# Patient Record
(demographics unavailable — no encounter records)

---

## 2024-11-15 NOTE — PHYSICAL EXAM
[Midline] : trachea located in midline position [Normal] : no rashes [de-identified] : AD CI and effusion

## 2024-11-15 NOTE — REASON FOR VISIT
[Subsequent Evaluation] : a subsequent evaluation for [Gastroesophageal Reflux] : gastroesophageal reflux [FreeTextEntry2] : otosclerosis, reflux and globus sensation

## 2024-11-15 NOTE — HISTORY OF PRESENT ILLNESS
[de-identified] : 63 year old female with history of sarcoidosis, otosclerosis, reflux and dysphagia presents for f/u Has had cough since august - tried prednisone with relief but cough returned once she stopped. Currently on levalbuterol nebs, trelegy ellipta, azelastine, albuterol PRN Having PND and total loss of voice for about 1 week - now it is still hoarse Reports continued globus sensation and frequent throat clearing, having thick clear secretions Exposed to norovirus 2 weeks ago No longer on omeprazole, now on pantoprazole. Did not feel famotidine helped  Feels swallowing is stable Reports wheezing at night Occasional use of right hearing aid - not wearing it today. Intermittent right otalgia and tinnitus Denies otorrhea and ear infections.

## 2025-01-13 NOTE — PHYSICAL EXAM
[de-identified] : Patient is WDWN, alert, and in no acute distress. Breathing is unlabored. She is grossly oriented to person, place and time.  Right thumb: Tenderness, edema, and ecchymosis present. Limited ROM in thumb. Mild numbness in right thumb.    [de-identified] : AP, lateral, and oblique views of the right thumb were obtained today and revealed possible foreign body, otherwise no abnormalities.

## 2025-01-13 NOTE — ADDENDUM
[FreeTextEntry1] : This note was written by Hank Dejesus on 01/13/2025 actively solely ISMAEL Jarrett M.D.     All medical record entries made by the Scribe were at my, ISMAEL Jarrett M.D. direction and personally dictated by me on 01/13/2025. I have personally reviewed the chart and agree that the record reflects my personal performance of the history, physical exam, assessment, and plan.

## 2025-01-13 NOTE — DISCUSSION/SUMMARY
[de-identified] : The underlying pathophysiology was reviewed with the patient. XR films were reviewed with the patient. Discussed at length the nature of the patient's condition. Her right thumb symptoms appear secondary to laceration.  Continue using bacitracin for wound and antibiotics. Advised to work with gloves.   Patient should follow up next week for suture removal.

## 2025-01-13 NOTE — HISTORY OF PRESENT ILLNESS
[de-identified] : Patient is a 64 y/o female with c/o right thumb pain. She broke a champagne bottle on Dec 31st, 2024, and a large piece of glass lacerated her right thumb. She went to [facility] immediately and was put in sutures due to laceration. She was given Cephalexin at first and now is currently taking Doxycycline. She works as a teacher.

## 2025-01-13 NOTE — PHYSICAL EXAM
[de-identified] : Patient is WDWN, alert, and in no acute distress. Breathing is unlabored. She is grossly oriented to person, place and time.  Right thumb: Tenderness, edema, and ecchymosis present. Limited ROM in thumb. Mild numbness in right thumb.    [de-identified] : AP, lateral, and oblique views of the right thumb were obtained today and revealed possible foreign body, otherwise no abnormalities.

## 2025-01-13 NOTE — HISTORY OF PRESENT ILLNESS
[de-identified] : Patient is a 62 y/o female with c/o right thumb pain. She broke a champagne bottle on Dec 31st, 2024, and a large piece of glass lacerated her right thumb. She went to [facility] immediately and was put in sutures due to laceration. She was given Cephalexin at first and now is currently taking Doxycycline. She works as a teacher.

## 2025-01-13 NOTE — DISCUSSION/SUMMARY
[de-identified] : The underlying pathophysiology was reviewed with the patient. XR films were reviewed with the patient. Discussed at length the nature of the patient's condition. Her right thumb symptoms appear secondary to laceration.  Continue using bacitracin for wound and antibiotics. Advised to work with gloves.   Patient should follow up next week for suture removal.

## 2025-01-21 NOTE — HISTORY OF PRESENT ILLNESS
[de-identified] : Patient is a 64 y/o female with c/o right thumb pain. She broke a champagne bottle on Dec 31st, 2024, and a large piece of glass lacerated her right thumb. She went to [facility] immediately and was put in sutures due to laceration. She was given Cephalexin at first and now is currently taking Doxycycline. She works as a teacher.  Today, 01/21/2025, the patient presents for a follow-up evaluation and suture removal. Her symptoms have improved since last visit. She reports compliance with her Bactrim medication.

## 2025-01-21 NOTE — PHYSICAL EXAM
[de-identified] : Patient is WDWN, alert, and in no acute distress. Breathing is unlabored. She is grossly oriented to person, place and time.  Right thumb: Tenderness, edema, and ecchymosis present. Limited ROM in thumb. Mild numbness in right thumb.   No signs of infection.  [de-identified] : AP, lateral, and oblique views of the right thumb were obtained on 01/13/2025 and revealed possible glass foreign body adjacent to radial base of the distal phalanx,

## 2025-01-21 NOTE — DISCUSSION/SUMMARY
[de-identified] : The underlying pathophysiology was reviewed with the patient. XR films were reviewed with the patient. Discussed at length the nature of the patient's condition. Her right thumb symptoms appear secondary to laceration.   The sutures were removed. The patient was instructed on local wound care and to keep the incision site dry for 7 more days. The patient can wear a band-aid They may then begin to massage the scar with vitamin E oil. Gentle range of motion, stretching, and strengthening exercises were encouraged. Patient should actively work on gradually increasing use of the hand, as tolerated.    Patient was advised to take OTC medications and topical analgesic for pain management.  The patient can discontinue her antibiotic medication.  Patient should follow up in 2 weeks.

## 2025-01-31 NOTE — PHYSICAL EXAM
[Midline] : trachea located in midline position [Normal] : no rashes [de-identified] : AD CI and effusion

## 2025-01-31 NOTE — HISTORY OF PRESENT ILLNESS
[de-identified] : 63 year old female with history of sarcoidosis, otosclerosis, reflux and dysphagia presents for f/u and SLN injection Has had cough since august. Was decreasing from last visit in November.  Recently completed 2 abx course cephalexin and doxycycline after recent right hand trauma and laceration.  Cough recently returned mid January.  Continues on omeprazole in AM. Occasionally will take pantoprazole PRN at night.  Continues to have voice hoarseness with PND. Constant throat clearing. Had received voice therapy sessions but no further sessions planned. Doxycycline finished 2 weeks Reports intermittent wheezing at night which resolves.  Levalbuterol nebs, trelegy ellipta, azelastine, albuterol PRN  Having PND and total loss of voice for about 1 week - now it is still hoarse Swallowing stable denies dysphagia. Noted occasional gagging  Occasional use of right hearing aid - not wearing it today. Intermittent right otalgia and tinnitus Denies otorrhea and ear infections.

## 2025-02-04 NOTE — PHYSICAL EXAM
[No Acute Distress] : no acute distress [Normal Oropharynx] : normal oropharynx [III] : Mallampati Class: III [Normal Appearance] : normal appearance [No Neck Mass] : no neck mass [Normal Rate/Rhythm] : normal rate/rhythm [Normal S1, S2] : normal s1, s2 [No Resp Distress] : no resp distress [Clear to Auscultation Bilaterally] : clear to auscultation bilaterally [No Abnormalities] : no abnormalities [Benign] : benign [Normal Gait] : normal gait [No Clubbing] : no clubbing [No Cyanosis] : no cyanosis [No Edema] : no edema [FROM] : FROM [Normal Color/ Pigmentation] : normal color/ pigmentation [No Focal Deficits] : no focal deficits [Oriented x3] : oriented x3 [Normal Affect] : normal affect [TextBox_2] : ow  [TextBox_54] : 2/6 systolic murmur [TextBox_68] : I:E 1:3, clear

## 2025-02-04 NOTE — HISTORY OF PRESENT ILLNESS
[FreeTextEntry1] : Ms. Marquez is a 63 year old female with a history of abnormal chest CT, allergic rhinitis, GERD, snoring, SOB, who comes in today for a follow-up pulmonary evaluation. Her chief complaint is  -she notes developing a cough  -she notes slicing her finger open and being put on doxycycline  -she notes her GERD became active  -she notes appetite is stable -she notes diet is good -she notes SOB with exertion  -she notes vision is stable -she notes bowels are regular -she notes concern over XRAY -she notes snoring   -she denies any headaches, nausea, emesis, fever, chills, sweats, chest pain, chest pressure, wheezing, palpitations, diarrhea, constipation, dysphagia, vertigo, arthralgias, myalgias, leg swelling, itchy eyes, itchy ears, or sour taste in the mouth.

## 2025-02-04 NOTE — REASON FOR VISIT
[Follow-Up] : a follow-up visit [FreeTextEntry1] : COVID-19 1/2022, 11/2022, influenza 12/2022, abnormal chest CT, asthma, allergic rhinitis, GERD, sarcoidosis, snoring, SOB, and throat clearing

## 2025-02-04 NOTE — ADDENDUM
[FreeTextEntry1] : Documented by Jett Mcrae acting as a scribe for Dr. Johnathan Parker on 02/04/2025. All medical record entries made by the Scribe were at my, Dr. Johnathan Parker's, direction and personally dictated by me on 02/04/2025. I have reviewed the chart and agree that the record accurately reflects my personal performance of the history, physical exam, assessment and plan. I have also personally directed, reviewed, and agree with the discharge instructions.

## 2025-02-04 NOTE — PROCEDURE
[FreeTextEntry1] : Full PFT reveals normal flows; FEV1 was  2.10L which is 107% of predicted; normal lung volumes; normal diffusion at 3.75, which is 93% of predicted; normal flow volume loop. PFTs were performed to evaluate for SOB  FENO was not covered; a normal value being less than 25 Fractional exhaled nitric oxide (FENO) is regarded as a simple, noninvasive method for assessing eosinophilic airway inflammation. Produced by a variety of cells within the lung, nitric oxide (NO) concentrations are generally low in healthy individuals. However, high concentrations of NO appear to be involved in nonspecific host defense mechanisms and chronic inflammatory diseases such as asthma. The American Thoracic Society (ATS) therefore has recommended using FENO to aid in the diagnosis and monitoring of eosinophilic airway inflammation and asthma, and for identifying steroid responsive individuals whose chronic respiratory symptoms may be caused by airway inflammation.   The American Thoracic Society (ATS) strongly recommends the use of FeNO measurement to aid in the assessment, management, and long-term monitoring of asthma. In their 2011 clinical practice guideline, the ATS emphasizes the importance of using FeNO.

## 2025-02-04 NOTE — ASSESSMENT
[FreeTextEntry1] : Ms. Marquez is a 61 year old female history of allergies, ?OSAS, severe persistent eosinophilic asthma, urticaria, overweight, s/p surgery for endometriosis and diverticulitis, sarcoidosis, and GERD who now comes in for a follow-up pulmonary evaluation s/p Covid-19 4/2020 - s/p rxn to COVID-19 vaccine Pfizer - COVID-19 1/2022- residual cough/LPR/PND, active asthma- improved with Nucala s/p COVID-19 1/2022, influenza 12/2022- resolved s/p multiple infections ?Nucala-related (resolved)- stable except LPR s/p steroid injection    Her shortness of breath is multifactorial due to: -overweight/out of shape -poor breathing mechanics -?CAD - respiratory issues (asthma)/ sarcoidosis  problem 1: (+) severe persistent asthma (stable on biologic rx) - non compliant -continue Accolate 20 mg BID - Trelegy 200 1 inhalation QD if needed) - Continue Proventil 2 Q6H or nebulizer -add Albuterol (.83) via nebulizer, up to Q6H Asthma is believed to be caused by inherited (genetic) and environmental factor, but its exact cause is unknown. Asthma may be triggered by allergens, lung infections, or irritants in the air. Asthma triggers are different for each person  Problem 1A: chest pain (quiet)- resolved - s/p ESR - HOLD colchicine.6 BID  problem 2: eosinophilic asthma (quiet) -candidate for Nucala / Fasenra / Dupixent - Nucala/ Fasenra if needed - approved but VIVO hesitant.- Nucala 1st shot 5/25/2022- HOLD NUCALA (6/2023) -if needed, start Dupixent -The safety and efficacy of Nucala was established in three double-blind, randomized, placebo-controlled trials in patients with severe asthma. Compared to a placebo, patients with severe asthma receiving Nucala had fewer exacerbation requiring hospitalization and/or emergency department visits, and a longer time to first exacerbation. In addition, patients with severe asthma receiving Nucala or Fasenra experienced greater reductions in their daily maintenance oral corticosteroid dose, while maintaining asthma control compared with patients receiving placebo. Treatment with Nucala did not result in a significant improvement in lung function, as measured by the volume of air exhaled by patients in one second. The most common side effects include: headache, injection site reactions, back pain, weakness, and fatigue; hypersensitivity reactions can occur within hours or days including swelling of the face, mouth, and tongue, fainting, dizziness, hives, breathing problems, and rash; herpes zoster infections have occurred. The drug is a monoclonal antibody that inhibits interleukin-5 which helps regular eosinophils, a type of white blood cell that contributes to asthma. The over-production of eosinophils can cause inflammation in the lungs, increasing the frequency of asthma attacks. Patients must also take other medications, including high dose inhaled corticosteroids and at least one additional asthma drug.  Problem 2A: ?Immunodeficiency -R/o immune deficiency -Complete blood work to include: quantitative immunoglobulins, IgG subsets, strep pneumonia titers, T cell subsets -Due to the fact that this pt has had more infections than would be expected and immunological blood work is indicated this would include: IgG subclasses, quantitative immunoglobulins, Strep pneumoniae titers as well as Vitamin D levels. Based on this blood work we will be able to decide where the pt needs additional pneumococcal vaccine, either Prevnar 13 or pneumovax. Immunology evaluation will also be potentially indicated.  problem 3: Sarcoidosis -recommended to follow up with Ophthalmologist -recommended to receive a cardiac evaluation -Sarcoidosis is a medical mystery and can present with very serious illness or little consequence. The disease can affect any organ including skin, liver, lymph glands, spleen, eyes, nervous system, musculoskeletal system, heart, brain, kidneys, and including the lungs. Pulmonary sarcoidosis can cause loss of lung volume and abnormal lung stiffness. This disease is characterized by presence of granulomas, small areas of inflamed cells. Sarcoidosis patients should have regular cardiac and eye examinations as well as regular PFTs.  Problem 4: PND syndrome (Allergy) -(active) -continue Astelin 0.15% 1 sniff/nostril BID - off Olopatadine 0.6%, 1 sniff each nostril BID - Recommended Xlear BID - continue Nasacort 1 sniff BID -continue Claritin 10 mg QD - continue Xyzal 5 mg QHS or zyrtex 10 qH - Environmental measures for allergies were encouraged including mattress and pillow cover, air purifier, and environmental controls.  Problem 5: LPRD -recommended Reflux Gourmet   -continue Pepcid 40 mg QHS -Omeprazole 40 mg before breakfast and dinner (PRN) -continue f/p w/ ENT (Rosalio) - s/p steroid injection  -Rule of 2s: avoid eating too much, eating too late, eating too spicy, eating two hours before bed. -Things to avoid including overeating, spicy foods, tight clothing, eating within three hours of bed, this list is not all inclusive. -For treatment of reflux, possible options discussed including diet control, H2 blockers, PPIs, as well as coating motility agents discussed as treatment options. Timing of meals and proximity of last meal to sleep were discussed. If symptoms persist, a formal gastrointestinal evaluation is needed.  problem 6: Urticaria/allergies - controlled -blood work to include: IgE level (-), eosinophil level (+), vitamin D level (+), food IgE level (-), and asthma profile (-)  problem 7: r/o YAYO (borderline) (4/2020) -s/p home sleep study to r/o YAYO - if symptoms persist - complete in Lab sleep study (8/2023) Sleep apnea is associated with adverse clinical consequences which an affect most organ systems. Cardiovascular disease risk includes arrhythmias, atrial fibrillation, hypertension, coronary artery disease, and stroke. Metabolic disorders include diabetes type 2, non-alcoholic fatty liver disease. Mood disorder especially depression; and cognitive decline especially in the elderly. Associations with chronic reflux/Wilde's esophagus some but not all inclusive. -Reasons include arousal consistent with hypopnea; respiratory events most prominent in REM sleep or supine position; therefore sleep staging and body position are important for accurate diagnosis and estimation of AHI.  problem 8: abnormal chest CT (Lymph nodes & Left upper lung density) c/w sarcoidosis adenopathy Ddx: sarcoidosis, lymphoma, primary lung cancer, tuberculosis, HERNANDEZ -s/p blood work: ACE level (Done), ESR (Done), hypersensitivity panel and QuantiFERON Gold (-) - s/p Lung Biopsy c/w sarcoidosis  -CT 9/2025 --s/p sarcoidosis, she is to have a cardiac evaluation, cardiac MRI and ophthalmologic evaluation - stable CAT scans are the only radiological modality to identify abnormalities w/in the lungs with regards to nodules/masses/lymph nodes. Risks, benefits were reviewed in detail. The guidelines for abnormalities include follow up CT scans at various intervals which could range from 6 weeks to 1 year intervals. If there is a change for the worse then consideration for a biopsy will be considered if you are a candidate. Second opinion evaluation with a thoracic surgeon or an interventional radiologist could be offered.  problem 9: overweight Weight loss, exercise, and diet control were discussed and are highly encouraged. Treatment options were given such as, aqua therapy, and contacting a nutritionist. Recommended to use the elliptical, stationary bike, less use of treadmill. Mindful eating was explained to the patient Obesity is associated with worsening asthma, shortness of breath, and potential for cardiac disease, diabetes, and other underlying medical conditions.  problem 10: poor breathing mechanics Proper breathing techniques were reviewed with an emphasis of exhalation. Patient instructed to breath in for 1 second and out for four seconds. Patient was encouraged to not talk while walking.  Problem 11: s/p COVID-19 infection 1/2022, 11/2022: -s/p covid 19 vaccine Pfizer x1 -rxn -Check antibodies -Hold 2nd vaccine dose until booster -covid 19 vaccine discussed with patient Immune Support Recommendations: -Recommend SPM -OTC Vitamin C 500mg BID -OTC Quercetin 250-500mg BID -OTC Zinc 75-100mg per day -OTC Melatonin 1or 2mg a night -OTC Vitamin D 1-4000mg per day  Problem 12: health maintenance -recommended SaNOtize nasal spray -s/p influenza 12/2022 - flu shot (refused) -recommended strep pneumonia vaccines: Prevnar-13 vaccine, followed by Pneumo vaccine 23 one year following -recommended early intervention for URIs -recommended regular osteoporosis evaluations -recommended early dermatological evaluations -recommended after the age of 50 to the age of 70, colonoscopy every 5 years  F/P in 3-4 months with spi and DLCO Patient is encouraged to call with any changes, concerns, or questions.

## 2025-07-08 NOTE — HISTORY OF PRESENT ILLNESS
[de-identified] : 63 year old female with history of sarcoidosis, otosclerosis, reflux and dysphagia presents for f/u  s/p SLN injection 1/31/25 & 04/08/25 Here today for 3rd kenalog injection   Reports great results after last injection, lasting for 2.5-3 months.  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  Intermittent voice hoarseness remains the same.  No longer experiencing "throat wheeze" or choking sensation.  No complaints of ear issues today.  Continues azelastine and ceterizine daily for allergies No recent courses of abx Swallowing stable denies dysphagia Denies otorrhea and ear infections.

## 2025-07-08 NOTE — CONSULT LETTER
[Dear  ___] : Dear  [unfilled], [Please see my note below.] : Please see my note below. [Consult Closing:] : Thank you very much for allowing me to participate in the care of this patient.  If you have any questions, please do not hesitate to contact me. [Sincerely,] : Sincerely, [Courtesy Letter:] : I had the pleasure of seeing your patient, [unfilled], in my office today. [FreeTextEntry3] : Celestino Santamaria MD, PhD Chief, Division of Laryngology Department of Otolaryngology [facility] Otolaryngology, [facility]

## 2025-07-08 NOTE — ADDENDUM
[FreeTextEntry1] : Documented by Du Valerio acting as scribe for Dr. Santamaria on 07/08/2025. All Medical record entries made by the Scribe were at my, Dr. Santamaria, direction and personally dictated by me on 07/08/2025 . I have reviewed the chart and agree that the record accurately reflects my personal performance of the history, physical exam, assessment and plan. I have also personally directed, reviewed, and agreed with the discharge instructions.

## 2025-07-08 NOTE — DATA REVIEWED
[de-identified] : Audiogram 7/8/2025 Type A AU. Left Ear WNL with a mild HFSNHL at 8000Hz. Right Ear severe profound mixed HL, conductive-dominant

## 2025-07-24 NOTE — PHYSICAL EXAM
[No Acute Distress] : no acute distress [Normal Oropharynx] : normal oropharynx [III] : Mallampati Class: III [Normal Appearance] : normal appearance [No Neck Mass] : no neck mass [Normal Rate/Rhythm] : normal rate/rhythm [Normal S1, S2] : normal s1, s2 [No Resp Distress] : no resp distress [Clear to Auscultation Bilaterally] : clear to auscultation bilaterally [No Abnormalities] : no abnormalities [Benign] : benign [Normal Gait] : normal gait [No Clubbing] : no clubbing [No Cyanosis] : no cyanosis [No Edema] : no edema [FROM] : FROM [Normal Color/ Pigmentation] : normal color/ pigmentation [No Focal Deficits] : no focal deficits [Oriented x3] : oriented x3 [Normal Affect] : normal affect [TextBox_2] : ow  [TextBox_68] : I:E 1:3, clear

## 2025-07-24 NOTE — ASSESSMENT
[FreeTextEntry1] : Ms. Marquez is a 63 year old female history of allergies, ?OSAS, severe persistent eosinophilic asthma, urticaria, overweight, s/p surgery for endometriosis and diverticulitis, sarcoidosis, and GERD who now comes in for a follow-up pulmonary evaluation s/p Covid-19 4/2020 - s/p rxn to COVID-19 vaccine Pfizer - COVID-19 1/2022- residual cough/LPR/PND, active asthma- improved with Nucala s/p COVID-19 1/2022, influenza 12/2022- resolved s/p multiple infections ?Nucala-related (resolved)- stable except LPR s/p steroid injection    Her shortness of breath is multifactorial due to: -overweight/out of shape -poor breathing mechanics -?CAD - respiratory issues (asthma)/ sarcoidosis  problem 1: (+) severe persistent asthma (Active) -continue Accolate 20 mg BID - Trelegy 200 1 inhalation QD if needed) - Continue Proventil 2 Q6H or nebulizer -continue Albuterol (.83) via nebulizer, up to Q6H Asthma is believed to be caused by inherited (genetic) and environmental factor, but its exact cause is unknown. Asthma may be triggered by allergens, lung infections, or irritants in the air. Asthma triggers are different for each person  Problem 1A: chest pain (quiet)- resolved - s/p ESR - HOLD colchicine.6 BID  problem 2: eosinophilic asthma (quiet) -candidate for Nucala / Fasenra / Dupixent - Nucala/ Fasenra if needed - approved but [facility] hesitant.- Nucala 1st shot 5/25/2022- HOLD NUCALA (6/2023) -if needed, start Dupixent -The safety and efficacy of Nucala was established in three double-blind, randomized, placebo-controlled trials in patients with severe asthma. Compared to a placebo, patients with severe asthma receiving Nucala had fewer exacerbation requiring hospitalization and/or emergency department visits, and a longer time to first exacerbation. In addition, patients with severe asthma receiving Nucala or Fasenra experienced greater reductions in their daily maintenance oral corticosteroid dose, while maintaining asthma control compared with patients receiving placebo. Treatment with Nucala did not result in a significant improvement in lung function, as measured by the volume of air exhaled by patients in one second. The most common side effects include: headache, injection site reactions, back pain, weakness, and fatigue; hypersensitivity reactions can occur within hours or days including swelling of the face, mouth, and tongue, fainting, dizziness, hives, breathing problems, and rash; herpes zoster infections have occurred. The drug is a monoclonal antibody that inhibits interleukin-5 which helps regular eosinophils, a type of white blood cell that contributes to asthma. The over-production of eosinophils can cause inflammation in the lungs, increasing the frequency of asthma attacks. Patients must also take other medications, including high dose inhaled corticosteroids and at least one additional asthma drug.  Problem 2A: ?Immunodeficiency -R/o immune deficiency -s/p blood work to include: quantitative immunoglobulins, IgG subsets, strep pneumonia titers, T cell subsets -Due to the fact that this pt has had more infections than would be expected and immunological blood work is indicated this would include: IgG subclasses, quantitative immunoglobulins, Strep pneumoniae titers as well as Vitamin D levels. Based on this blood work we will be able to decide where the pt needs additional pneumococcal vaccine, either Prevnar 13 or pneumovax. Immunology evaluation will also be potentially indicated.  problem 3: Sarcoidosis -recommended to follow up with Ophthalmologist -recommended to receive a cardiac evaluation -Sarcoidosis is a medical mystery and can present with very serious illness or little consequence. The disease can affect any organ including skin, liver, lymph glands, spleen, eyes, nervous system, musculoskeletal system, heart, brain, kidneys, and including the lungs. Pulmonary sarcoidosis can cause loss of lung volume and abnormal lung stiffness. This disease is characterized by presence of granulomas, small areas of inflamed cells. Sarcoidosis patients should have regular cardiac and eye examinations as well as regular PFTs.  Problem 4: PND syndrome (Allergy) -(active) -continue Astelin 0.15% 1 sniff/nostril BID - off Olopatadine 0.6%, 1 sniff each nostril BID - continue Nasacort 1 sniff BID -continue Claritin 10 mg QD - continue Xyzal 5 mg QHS or zyrtex 10 qH - Environmental measures for allergies were encouraged including mattress and pillow cover, air purifier, and environmental controls.  Problem 5: LPRD  -continue Pepcid 40 mg QHS -continue Pantoprazole 40 mg before breakfast and dinner (PRN) -continue f/p w/ ENT ([facility]) - s/p steroid injection  -Rule of 2s: avoid eating too much, eating too late, eating too spicy, eating two hours before bed. -Things to avoid including overeating, spicy foods, tight clothing, eating within three hours of bed, this list is not all inclusive. -For treatment of reflux, possible options discussed including diet control, H2 blockers, PPIs, as well as coating motility agents discussed as treatment options. Timing of meals and proximity of last meal to sleep were discussed. If symptoms persist, a formal gastrointestinal evaluation is needed.  problem 6: Urticaria/allergies - controlled -s/p work to include: IgE level (-), eosinophil level (+), vitamin D level (+), food IgE level (-), and asthma profile (-)  problem 7: r/o YAYO (borderline) (4/2020) - pending -s/p home sleep study to r/o YAYO - if symptoms persist - complete in Lab sleep study (8/2023) Sleep apnea is associated with adverse clinical consequences which an affect most organ systems. Cardiovascular disease risk includes arrhythmias, atrial fibrillation, hypertension, coronary artery disease, and stroke. Metabolic disorders include diabetes type 2, non-alcoholic fatty liver disease. Mood disorder especially depression; and cognitive decline especially in the elderly. Associations with chronic reflux/Wilde's esophagus some but not all inclusive. -Reasons include arousal consistent with hypopnea; respiratory events most prominent in REM sleep or supine position; therefore sleep staging and body position are important for accurate diagnosis and estimation of AHI.  problem 8: abnormal chest CT (Lymph nodes & Left upper lung density) c/w sarcoidosis adenopathy Ddx: sarcoidosis, lymphoma, primary lung cancer, tuberculosis, HERNANDEZ -s/p blood work: ACE level (Done), ESR (Done), hypersensitivity panel and QuantiFERON Gold (-) - s/p Lung Biopsy c/w sarcoidosis  -CT 9/2025 --s/p sarcoidosis, she is to have a cardiac evaluation, cardiac MRI and ophthalmologic evaluation - stable CAT scans are the only radiological modality to identify abnormalities w/in the lungs with regards to nodules/masses/lymph nodes. Risks, benefits were reviewed in detail. The guidelines for abnormalities include follow up CT scans at various intervals which could range from 6 weeks to 1 year intervals. If there is a change for the worse then consideration for a biopsy will be considered if you are a candidate. Second opinion evaluation with a thoracic surgeon or an interventional radiologist could be offered.  problem 9: overweight Weight loss, exercise, and diet control were discussed and are highly encouraged. Treatment options were given such as, aqua therapy, and contacting a nutritionist. Recommended to use the elliptical, stationary bike, less use of treadmill. Mindful eating was explained to the patient Obesity is associated with worsening asthma, shortness of breath, and potential for cardiac disease, diabetes, and other underlying medical conditions.  Problem 10: health maintenance -recommend flu vaccine after 10/15/2025 - flu shot (refused) -recommended strep pneumonia vaccines: Prevnar-13 vaccine, followed by Pneumo vaccine 23 one year following -recommended early intervention for URIs -recommended regular osteoporosis evaluations -recommended early dermatological evaluations -recommended after the age of 50 to the age of 70, colonoscopy every 5 years  F/P in 3-4 months with spi and DLCO Patient is encouraged to call with any changes, concerns, or questions.

## 2025-07-24 NOTE — HISTORY OF PRESENT ILLNESS
[TextBox_4] : Ms. Marquez is a 63 year old female with a history of abnormal chest CT, allergic rhinitis, GERD, snoring, SOB, who comes in today for a follow-up pulmonary evaluation. Her chief complaint is  -reports she went away exposed to cigarette smoke -reports since the exposure she had a cough -reports the cough worsened -reports she started using her nebulizer with relief from her sx -reports SOB -reports wheezing -reports she goes to Dr. Santamaria (ENT) for steroid injection in her throat -reports she has not used her Trelegy   .denies any headaches, nausea, vomiting, fever, chills, sweats, chest pain, chest pressure, palpitations, coughing, wheezing, fatigue, constipation, dysphagia, dizziness, leg swelling, leg pain, itchy eyes, itchy ears, heartburn, reflux or sour taste in the mouth.